# Patient Record
Sex: FEMALE | Race: WHITE
[De-identification: names, ages, dates, MRNs, and addresses within clinical notes are randomized per-mention and may not be internally consistent; named-entity substitution may affect disease eponyms.]

---

## 2018-08-14 ENCOUNTER — HOSPITAL ENCOUNTER (OUTPATIENT)
Dept: HOSPITAL 58 - RAD | Age: 28
Discharge: HOME | End: 2018-08-14
Attending: INTERNAL MEDICINE

## 2018-08-14 DIAGNOSIS — R42: Primary | ICD-10-CM

## 2018-08-14 DIAGNOSIS — R51: ICD-10-CM

## 2018-08-14 NOTE — CT
EXAM:  CT head without contrast. 

  

HISTORY:  Headache, dizziness. 

  

COMPARISON:  None available. 

  

TECHNIQUE:  Multiple axial images of the brain were obtained from the skull base through the vertex w
ithout intravenous contrast.  Multiplanar reformats were provided. 

  

FINDINGS:  There is no intracranial hemorrhage or extraaxial collection.  The gray-white differentiat
ion is maintained without evidence for acute large vascular territory infarction.  There is crowding 
of structures at the foramen magnum with lack of visualized CSF at this level, although artifact limi
ts evaluation.  Otherwise, the cortical sulci and basal cisterns are well visualized.  There is no hy
drocephalus, mass effect, or midline shift.  The paranasal sinuses and mastoid air cells are clear.  
The calvarium is intact. 

  

-------------------------------- 

  

IMPRESSION: 

1.  No acute intracranial abnormality. 

2.  Crowding of structures at the foramen magnum.  Correlation with MRI is recommended to evaluate fo
r Chiari I malformation.

## 2018-08-17 ENCOUNTER — HOSPITAL ENCOUNTER (OUTPATIENT)
Dept: HOSPITAL 58 - RAD | Age: 28
Discharge: HOME | End: 2018-08-17
Attending: INTERNAL MEDICINE

## 2018-08-17 DIAGNOSIS — Q07.00: Primary | ICD-10-CM

## 2018-08-17 DIAGNOSIS — R42: ICD-10-CM

## 2018-08-17 DIAGNOSIS — R51: ICD-10-CM

## 2018-08-17 NOTE — MRI
EXAM:  Brain MRI with and without contrast. 

  

HISTORY:  Dizziness and headache. 

  

COMPARISON:  Head CT 08/14/2018. 

  

TECHNIQUE:  Multiplanar, multisequence MR images were acquired of the brain before and after administ
ration of intravenous contrast. 

  

FINDINGS:  The midline structures are central.  The cerebellar tonsils extend 10 mm below the foramen
 magnum and have a mildly pointed configuration consistent with a Chiari one malformation.  This prod
uces mild crowding at the craniocervical junction.  The ventricles and sulci are generally normal in 
size and configuration.  There are no abnormal extra-axial fluid collections. 

  

The brain parenchyma has no diffusion restriction to suggest acute hypoperfusion or infarction.  Ther
e are no abnormal T2 or FLAIR hyperintensities and no abnormal foci of dark gradient echo signal.  Af
ter administration of contrast, no enhancing lesions are identified.  The corpus callosum is normal. 
 The pituitary gland is normal in size and has homogeneous contrast enhancement.  The infundibulum is
 midline. 

  

There are no intraorbital masses.  Paranasal sinuses are clear except for focal mucosal thickening in
 a few left posterior ethmoid air cells.  There is mild mucosal thickening in a both maxillary antra.
  Middle ears and mastoids are unremarkable.  There is no abnormal contrast enhancement in the intern
al auditory canals or labyrinthine structures. 

  

Flow voids are present in the major intracranial arteries and dural venous sinuses. 

  

IMPRESSION: 

1.  Chiari one malformation. 

2.  No intracranial mass, hemorrhage or acute cerebral infarct.